# Patient Record
Sex: MALE | Race: WHITE | Employment: OTHER | URBAN - METROPOLITAN AREA
[De-identification: names, ages, dates, MRNs, and addresses within clinical notes are randomized per-mention and may not be internally consistent; named-entity substitution may affect disease eponyms.]

---

## 2022-12-05 ENCOUNTER — HOSPITAL ENCOUNTER (EMERGENCY)
Age: 62
Discharge: HOME | End: 2022-12-06
Attending: EMERGENCY MEDICINE

## 2022-12-05 VITALS
HEART RATE: 73 BPM | SYSTOLIC BLOOD PRESSURE: 139 MMHG | RESPIRATION RATE: 16 BRPM | OXYGEN SATURATION: 97 % | BODY MASS INDEX: 22.22 KG/M2 | DIASTOLIC BLOOD PRESSURE: 81 MMHG | HEIGHT: 69 IN | WEIGHT: 150 LBS | TEMPERATURE: 98.4 F

## 2022-12-05 DIAGNOSIS — H61.21 IMPACTED CERUMEN OF RIGHT EAR: Primary | ICD-10-CM

## 2022-12-05 PROCEDURE — 69209 REMOVE IMPACTED EAR WAX UNI: CPT

## 2022-12-05 PROCEDURE — 99283 EMERGENCY DEPT VISIT LOW MDM: CPT | Performed by: EMERGENCY MEDICINE

## 2022-12-05 PROCEDURE — 6370000000 HC RX 637 (ALT 250 FOR IP): Performed by: EMERGENCY MEDICINE

## 2022-12-05 RX ADMIN — Medication 5 DROP: at 22:36

## 2022-12-05 ASSESSMENT — ENCOUNTER SYMPTOMS
SHORTNESS OF BREATH: 0
COUGH: 0
NAUSEA: 0
EYE DISCHARGE: 0
SORE THROAT: 0
ABDOMINAL PAIN: 0
VOMITING: 0
EYE PAIN: 0
BACK PAIN: 0

## 2022-12-06 NOTE — ED PROVIDER NOTES
[address] ENCOUNTER      Pt Name: Jackie Waldron  MRN: [id number]  [place] 1960  Date of evaluation: 12/5/2022  Provider: Lisbeth Nyhan, MD    CHIEF COMPLAINT       Chief Complaint   Patient presents with    Otalgia         HISTORY OF PRESENT ILLNESS      Jackie Waldron is a 58 y.o. male who presents to the emergency department  for   Chief Complaint   Patient presents with    Otalgia       49-year-old male presents with several days of right ear pain. He relates he might have an earwax impaction. He states that he tried to use a kit at home to remove the earwax but does not believe that he was able to do so. Denies any significant change to his ear. Does endorse some discomfort. Denies any drainage from the ear. No fevers, chills or constitutional infectious symptoms. GCS of 15        Nursing Notes, Triage Notes & Vital Signs were reviewed. REVIEW OF SYSTEMS    (2-9 systems for level 4, 10 or more for level 5)     Review of Systems   Constitutional:  Negative for chills and fever. HENT:  Positive for ear pain. Negative for ear discharge and sore throat. Eyes:  Negative for pain and discharge. Respiratory:  Negative for cough and shortness of breath. Cardiovascular:  Negative for chest pain and palpitations. Gastrointestinal:  Negative for abdominal pain, nausea and vomiting. Endocrine: Negative for polydipsia and polyuria. Genitourinary:  Negative for dysuria and flank pain. Musculoskeletal:  Negative for back pain and neck pain. Skin:  Negative for pallor and wound. Neurological:  Negative for dizziness, facial asymmetry, light-headedness, numbness and headaches. Psychiatric/Behavioral:  Negative for confusion. Except as noted above the remainder of the review of systems was reviewed and negative. PAST MEDICAL HISTORY   History reviewed.  No pertinent past medical history. Prior to Admission medications    Not on File        There is no problem list on file for this patient. SURGICAL HISTORY     History reviewed. No pertinent surgical history. CURRENT MEDICATIONS       There are no discharge medications for this patient. ALLERGIES     Patient has no known allergies. FAMILY HISTORY     History reviewed. No pertinent family history. SOCIAL HISTORY       Social History     Socioeconomic History    Marital status: Legally      Spouse name: None    Number of children: None    Years of education: None    Highest education level: None   Tobacco Use    Smoking status: Every Day     Packs/day: 0.50     Types: Cigarettes   Substance and Sexual Activity    Drug use: No       SCREENINGS               PHYSICAL EXAM    (up to 7 for level 4, 8 or more for level 5)     ED Triage Vitals [12/05/22 2109]   BP Temp Temp Source Heart Rate Resp SpO2 Height Weight   139/81 98.4 °F (36.9 °C) Oral 73 16 97 % 5' 9\" (1.753 m) 150 lb (68 kg)       Physical Exam  Vitals reviewed. Constitutional:       Appearance: He is not ill-appearing or toxic-appearing. HENT:      Head: Normocephalic and atraumatic. Ears:      Comments: Right ear with cerumen impaction; some excoriations in right ear canal     Nose: No congestion or rhinorrhea. Mouth/Throat:      Mouth: Mucous membranes are moist.   Eyes:      Extraocular Movements: Extraocular movements intact. Pupils: Pupils are equal, round, and reactive to light. Cardiovascular:      Rate and Rhythm: Normal rate. Pulmonary:      Effort: Pulmonary effort is normal.      Breath sounds: No stridor. No wheezing. Abdominal:      Palpations: Abdomen is soft. Tenderness: There is no abdominal tenderness. There is no rebound. Musculoskeletal:         General: No tenderness or deformity. Normal range of motion. Cervical back: Normal range of motion and neck supple. Skin:     General: Skin is warm. Capillary Refill: Capillary refill takes less than 2 seconds. Neurological:      General: No focal deficit present. Mental Status: He is alert. DIAGNOSTIC RESULTS     Labs Reviewed - No data to display     RADIOLOGY:     Non-plain film images such as CT, Ultrasound and MRI are read by the radiologist. Plain radiographic images are visualized and preliminarily interpreted by the emergency physician. Interpretation per the Radiologist below, if available at the time of this note:    No orders to display         ED BEDSIDE ULTRASOUND:   Performed by ED Physician Jagdish Aranda MD       LABS:  Labs Reviewed - No data to display    All other labs were within normal range or not returned as of this dictation. EMERGENCY DEPARTMENT COURSE and DIFFERENTIAL DIAGNOSIS/MDM:   Vitals:    Vitals:    12/05/22 2109   BP: 139/81   Pulse: 73   Resp: 16   Temp: 98.4 °F (36.9 °C)   TempSrc: Oral   SpO2: 97%   Weight: 150 lb (68 kg)   Height: 5' 9\" (1.753 m)           MDM  Number of Diagnoses or Management Options  Impacted cerumen of right ear  Diagnosis management comments: 58-year-old male presents with several days of right ear pain. Believes he might have an earwax blockage. He states trying to use a cane at home to remove earwax but was unable to do so. Denies any trauma to his ear. Denies any drainage from his ear. Denies any fevers, chills other constitutional infectious symptoms. He presents with unremarkable vital signs. Is afebrile. No tachycardia. Respirations are within normal limits. Oxygen saturations are in the high 90s on room air. On exam he does appear to have earwax impaction on the right ear. There are some excoriations in the right ear canal.  We applied Debrox to the right ear and irrigated the ear. Some of the wax was removed. His TM does appear unremarkable. No signs of otitis media or other abnormality. He will continue with Debrox at home.   He will follow-up outpatient. He is agree with plan of care. He is discharged in stable condition with return precautions. Debrox is applied to right ear. We are attempting irrigation.        -  Patient seen and evaluated in the emergency department. -  Triage and nursing notes reviewed and incorporated. -  Old chart records reviewed and incorporated. -  Work-up included:  See above  -  Results discussed with patient. CONSULTS:  None    PROCEDURES:  None performed unless otherwise noted below     Procedures        FINAL IMPRESSION      1. Impacted cerumen of right ear          DISPOSITION/PLAN   DISPOSITION Decision To Discharge 12/06/2022 12:07:12 AM      PATIENT REFERRED TO:  Your Primary Care Physician          [address] [facility],[address] WALK-IN [facility]  [address] [place] [address]  [phone]  Go to   As needed    DISCHARGE MEDICATIONS:  There are no discharge medications for this patient. ED Provider Disposition Time  DISPOSITION Decision To Discharge 12/06/2022 12:07:12 AM      Appropriate personal protective equipment was worn during the patient's evaluation. These included surgical, eye protection, surgical mask or in 95 respirator and gloves. The patient was also placed in a surgical mask for the prevention of possible spread of respiratory viral illnesses. The Patient was instructed to read the package inserts with any medication that was prescribed. Major potential reactions and medication interactions were discussed. The Patient understands that there are numerous possible adverse reactions not covered. The patient was also instructed to arrange follow-up with his or her primary care provider for review of any pending labwork or incidental findings on any radiology results that were obtained. All efforts were made to discuss any incidental findings that require further monitoring. Controlled Substances Monitoring:     No flowsheet data found.     (Please note that portions of this note were completed with a voice recognition program.  Efforts were made to edit the dictations but occasionally words are mis-transcribed.)    Sarah Mccarty MD (electronically signed)  Attending Emergency Physician            Sarah Mccarty MD  12/06/22 [id number]

## 2022-12-06 NOTE — DISCHARGE INSTRUCTIONS
You may continue to use the debrox in in your right ear to help remove ear wax. If you develop any worsening or concerning symptoms, please seek immediate medical attention.